# Patient Record
Sex: FEMALE | Race: WHITE
[De-identification: names, ages, dates, MRNs, and addresses within clinical notes are randomized per-mention and may not be internally consistent; named-entity substitution may affect disease eponyms.]

---

## 2017-05-01 ENCOUNTER — HOSPITAL ENCOUNTER (EMERGENCY)
Dept: HOSPITAL 10 - FTE | Age: 62
Discharge: HOME | End: 2017-05-01
Payer: COMMERCIAL

## 2017-05-01 VITALS
WEIGHT: 163.14 LBS | BODY MASS INDEX: 30.02 KG/M2 | HEIGHT: 62 IN | BODY MASS INDEX: 30.02 KG/M2 | WEIGHT: 163.14 LBS | HEIGHT: 62 IN

## 2017-05-01 DIAGNOSIS — R05: Primary | ICD-10-CM

## 2017-05-01 DIAGNOSIS — F17.210: ICD-10-CM

## 2017-05-01 PROCEDURE — 99284 EMERGENCY DEPT VISIT MOD MDM: CPT

## 2017-05-01 NOTE — ERD
ER Documentation


Chief Complaint


Date/Time


DATE: 5/1/17 


TIME: 14:34


Chief Complaint


Complains of a cough x 3 weeks





HPI


Patient is a 61-year-old female who presents to the ED with cough for 3 weeks.  

She states that she also has had nasal congestion.  She denies shortness of 

breath or difficulty breathing.  She denies hemoptysis or night sweats.  She 

denies fevers or chills.  She denies leg pain or swelling.  She denies 

abdominal pain, nausea, vomiting or diarrhea.  She states that her friend from 

[employer] had similar symptoms.  She denies headache or dizziness.  She denies 

neck pain or stiffness.  She has tried Mucinex and Motrin for his symptoms.  No 

other medications and no other complaints.





ROS


All systems reviewed and are negative except as per history of present illness.





Medications


Home Meds


Active Scripts


Benzonatate* (Tessalon Perle*) 100 Mg Capsule, 100 MG PO Q8H Y for COUGH for 14 

Days, CAP


   Prov:CARL HURD PA-C         5/1/17


Cetirizine Hcl* (Zyrtec*) 10 Mg Capsule, 10 MG PO DAILY, #30 TAB.CHEW


   Prov:CARL HURD PA-C         5/1/17


Albuterol Sulfate* (Proair HFA*) 8.5 Gm Hfa.aer.ad, 2 PUFF INH Q4, #1 INHALER


   Prov:CARL HURD-C         5/1/17


Azithromycin* (Zithromax*) 250 Mg Tablet, 250 MG PO .ZPACK AS DIRECTED, #6 TAB


   TAKE 500 MG (2 TABS) THE FIRST DAY THEN 250 MG (1 TAB) DAYS 2-5


   Prov:CARL HURD-C         5/1/17





Allergies


Allergies:  


Coded Allergies:  


     No Known Allergy (Unverified , 5/1/17)





PMhx/Soc


Medical and Surgical Hx:  pt denies Medical Hx, pt denies Surgical Hx


History of Surgery:  No


Anesthesia Reaction:  No


Hx Neurological Disorder:  No


Hx Respiratory Disorders:  No


Hx Cardiac Disorders:  No


Hx Psychiatric Problems:  No


Hx Miscellaneous Medical Probl:  No


Hx Alcohol Use:  No


Hx Substance Use:  No


Smoking Status:  Current every day smoker





Physical Exam


Vitals





Vital Signs








  Date Time  Temp Pulse Resp B/P Pulse Ox O2 Delivery O2 Flow Rate FiO2


 


5/1/17 11:14 98.4 97 20 144/89 97   








Physical Exam





GENERAL: Well-developed, well-nourished female. Appears in no acute distress. 


HEAD: Normocephalic, atraumatic. 


EYES: Pupils are equally reactive bilaterally. EOMs grossly intact. No 

conjunctival erythema. 


ENT: Moist mucous membranes. No uvula deviation. No kissing tonsils. No 

exudates. 


NECK: Supple. No lymphadenopathy or thyromegaly. No meningismus. negative 

kernig. negative brudinski.  


LUNG: Clear to auscultation bilaterally. No rhonchi, wheezing, rales or coarse 

breath sounds. 


HEART: Regular rate and rhythm. No murmurs, rubs or gallops.


Extremities: Equal pulses bilaterally. No peripheral clubbing, cyanosis or 

edema. No unilateral leg swelling.


NEUROLOGIC: Alert and oriented. Moving all four extremities. 5/5 strength in 

all extremities. Normal speech. Steady gait. 


SKIN: Normal color. Warm and dry. No rashes or lesions. Capillary refill < 2 

seconds





Procedures/MDM


ER COURSE:


I kept the patient and/or family informed of laboratory and diagnostic imaging 

results throughout the emergency room course.





MEDICAL DECISION MAKING:


This is a 61-year-old female who presents with cough, congestion 3 weeks. 

Vital signs were reviewed. Patient is afebrile. Patient is not hypoxic.  

Patient is not toxic or ill-appearing.  Patient has a pulse of 97 and a blood 

pressure 144/89.  Her oxygen saturation is 97.  Patient likely has bronchitis.  

Due to patient's age and time of symptoms, I will treat the patient with 

azithromycin.  I do not think a chest x-ray is warranted at this time as patient

's lung examination is within normal limits and does not show signs of 

respiratory distress.  Low suspicion for pneumonia, PE, pneumothorax, ACS, 

epiglottitis, obstruction, TB, pertussis, meningitis, sepsis.








DISCHARGE:


At this time, patient is stable for discharge and outpatient management with no 

new complaints during the ER course. Patient was sent home with azithromycin, 

Tessalon Perles, Zyrtec and ProAir air.  Patient will be discharged home with 

instructions to recheck for new or worsening symptoms such as fever, nausea, 

weakness, LOC and to follow up with primary care in the next 1-2 days. Patient 

was advised to return to the ER for any new or worsening symptoms. Plan was 

discussed and patient and/or family understands and agrees. Home instructions 

were given.





Departure


Diagnosis:  


 Primary Impression:  


 Cough


Condition:  Stable


Patient Instructions:  Cough, Chronic, Uncertain Cause, (Adult)





Additional Instructions:  


Call your primary care doctor TOMORROW for an appointment during the next 1-2 

days.See the doctor sooner or return here if your condition worsens before your 

appointment time.











CARL HURD PA-C May 1, 2017 14:37

## 2018-07-17 ENCOUNTER — HOSPITAL ENCOUNTER (EMERGENCY)
Dept: HOSPITAL 91 - FTE | Age: 63
Discharge: HOME | End: 2018-07-17
Payer: COMMERCIAL

## 2018-07-17 ENCOUNTER — HOSPITAL ENCOUNTER (EMERGENCY)
Age: 63
Discharge: HOME | End: 2018-07-17

## 2018-07-17 DIAGNOSIS — S42.202A: Primary | ICD-10-CM
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
## 2018-07-17 DIAGNOSIS — R07.9: ICD-10-CM

## 2018-07-17 PROCEDURE — 99284 EMERGENCY DEPT VISIT MOD MDM: CPT

## 2018-07-17 PROCEDURE — 73080 X-RAY EXAM OF ELBOW: CPT

## 2018-07-17 PROCEDURE — 71045 X-RAY EXAM CHEST 1 VIEW: CPT

## 2018-07-17 PROCEDURE — 73030 X-RAY EXAM OF SHOULDER: CPT
